# Patient Record
Sex: FEMALE | Race: WHITE | NOT HISPANIC OR LATINO | Employment: FULL TIME | URBAN - METROPOLITAN AREA
[De-identification: names, ages, dates, MRNs, and addresses within clinical notes are randomized per-mention and may not be internally consistent; named-entity substitution may affect disease eponyms.]

---

## 2021-05-25 ENCOUNTER — APPOINTMENT (OUTPATIENT)
Dept: VACCINE CLINIC | Facility: HOSPITAL | Age: 31
End: 2021-05-25

## 2021-05-29 ENCOUNTER — IMMUNIZATION (OUTPATIENT)
Dept: VACCINE CLINIC | Facility: HOSPITAL | Age: 31
End: 2021-05-29

## 2021-05-29 PROCEDURE — 0001A: CPT | Performed by: INTERNAL MEDICINE

## 2021-05-29 PROCEDURE — 91300 HC SARSCOV02 VAC 30MCG/0.3ML IM: CPT | Performed by: INTERNAL MEDICINE

## 2021-06-19 ENCOUNTER — IMMUNIZATION (OUTPATIENT)
Dept: VACCINE CLINIC | Facility: HOSPITAL | Age: 31
End: 2021-06-19

## 2021-06-19 PROCEDURE — 0002A: CPT | Performed by: INTERNAL MEDICINE

## 2021-06-19 PROCEDURE — 91300 HC SARSCOV02 VAC 30MCG/0.3ML IM: CPT | Performed by: INTERNAL MEDICINE

## 2023-02-24 ENCOUNTER — OFFICE VISIT (OUTPATIENT)
Dept: FAMILY MEDICINE CLINIC | Facility: CLINIC | Age: 33
End: 2023-02-24
Payer: COMMERCIAL

## 2023-02-24 VITALS
WEIGHT: 231.5 LBS | OXYGEN SATURATION: 96 % | SYSTOLIC BLOOD PRESSURE: 134 MMHG | RESPIRATION RATE: 18 BRPM | TEMPERATURE: 98.7 F | HEIGHT: 66 IN | BODY MASS INDEX: 37.21 KG/M2 | HEART RATE: 104 BPM | DIASTOLIC BLOOD PRESSURE: 83 MMHG

## 2023-02-24 DIAGNOSIS — Z13.220 SCREENING FOR LIPOID DISORDERS: ICD-10-CM

## 2023-02-24 DIAGNOSIS — E06.9 THYROIDITIS: ICD-10-CM

## 2023-02-24 DIAGNOSIS — R41.840 INATTENTION: ICD-10-CM

## 2023-02-24 DIAGNOSIS — F41.1 GENERALIZED ANXIETY DISORDER: ICD-10-CM

## 2023-02-24 DIAGNOSIS — I10 PRIMARY HYPERTENSION: Primary | ICD-10-CM

## 2023-02-24 PROCEDURE — 99214 OFFICE O/P EST MOD 30 MIN: CPT | Performed by: FAMILY MEDICINE

## 2023-02-24 RX ORDER — DOXEPIN HYDROCHLORIDE 10 MG/1
10 CAPSULE ORAL NIGHTLY
COMMUNITY

## 2023-02-24 RX ORDER — METOPROLOL SUCCINATE 25 MG/1
25 TABLET, EXTENDED RELEASE ORAL EVERY EVENING
Qty: 90 TABLET | Refills: 1 | Status: SHIPPED | OUTPATIENT
Start: 2023-02-24

## 2023-02-24 RX ORDER — LANOLIN ALCOHOL/MO/W.PET/CERES
CREAM (GRAM) TOPICAL
COMMUNITY

## 2023-02-24 RX ORDER — LEVOTHYROXINE SODIUM 0.05 MG/1
50 TABLET ORAL
Qty: 90 TABLET | Refills: 1 | Status: SHIPPED | OUTPATIENT
Start: 2023-02-24

## 2023-02-24 RX ORDER — BUPROPION HYDROCHLORIDE 150 MG/1
150 TABLET ORAL DAILY
COMMUNITY
End: 2023-02-24 | Stop reason: SDUPTHER

## 2023-02-24 RX ORDER — BUPROPION HYDROCHLORIDE 300 MG/1
300 TABLET ORAL DAILY
Qty: 90 TABLET | Refills: 1 | Status: SHIPPED | OUTPATIENT
Start: 2023-02-24

## 2023-02-24 RX ORDER — METOPROLOL SUCCINATE 25 MG/1
25 TABLET, EXTENDED RELEASE ORAL EVERY EVENING
COMMUNITY
Start: 2022-12-22 | End: 2023-02-24 | Stop reason: SDUPTHER

## 2023-02-24 RX ORDER — LEVOTHYROXINE SODIUM 0.05 MG/1
100 TABLET ORAL
COMMUNITY
Start: 2022-12-25 | End: 2023-02-24 | Stop reason: SDUPTHER

## 2023-02-24 NOTE — PATIENT INSTRUCTIONS
You are going to try to find your psychological assessment from when you were a kid.  If you can then I will give you a prescription for Vyvanse to help you get through your test.  We also decided to bump up Wellbutrin to see if that helps you as well.  We did discuss You may be given a controlled substance.  These drugs are addicting and must be used with caution.  Use the drug only when needed and avoid operating heavy machinery or driving when using the medication until you know how you respond.  Dispose of the medication properly when you no longer need it.  I need to see you every 90 days, routine drug testing and Say monitoring will be done as required by [place] law.  If 90 days passes, I can not renew the medication. I recommend you schedule now.    I have ordered lab tests today.  You should receive a phone call or a [employer] message with those results.  If you have not heard from us in 7-10 days, please call the office.

## 2023-02-24 NOTE — PROGRESS NOTES
"Subjective     Regina Osman is a 33 y.o. female who presents with   Chief Complaint   Patient presents with   • Hypothyroidism   • Hypertension       History of Present Illness     Patient presents for follow-up of hypertension and thyroiditis.  She is not having any problems with her medications.  She also presents with anxiety that is doing well with Wellbutrin  mg.    Prior history of ADHD and was treated with Vyvanse in high school and studying for a [employer] exam and struggling with the studying.  Pass rate last test was 11-40% so she is worried.     She expresses interest in an increase in her Wellbutrin as an option as well for her attention needs.      The following data was reviewed by: Kathi Medrano MD on 02/24/2023:    Labs and recent visits reviewed in [facility] EHR         Review of Systems     Objective     /83 (BP Location: Right arm, Patient Position: Sitting, Cuff Size: Large Adult)   Pulse 104   Temp 98.7 °F (37.1 °C) (Oral)   Resp 18   Ht 167.6 cm (66\")   Wt 105 kg (231 lb 8 oz)   LMP 01/26/2023 (Approximate)   SpO2 96%   Breastfeeding No   BMI 37.37 kg/m²     Physical Exam  Constitutional:       Appearance: Normal appearance.   Cardiovascular:      Rate and Rhythm: Normal rate and regular rhythm.      Heart sounds: Normal heart sounds.   Pulmonary:      Effort: Pulmonary effort is normal.      Breath sounds: Normal breath sounds.   Neurological:      Mental Status: She is alert.   Psychiatric:         Behavior: Behavior normal.         Procedures EKG reviewed from 6/8/2021 [facility]    Assessment & Plan   Diagnoses and all orders for this visit:    1. Primary hypertension (Primary)  -     Comprehensive Metabolic Panel  -     metoprolol succinate XL (TOPROL-XL) 25 MG 24 hr tablet; Take 1 tablet by mouth Every Evening.  Dispense: 90 tablet; Refill: 1    2. Thyroiditis  -     TSH  -     Thyroid Peroxidase Antibody; Future  -     levothyroxine (SYNTHROID, " LEVOTHROID) 50 MCG tablet; Take 1 tablet by mouth Every Morning.  Dispense: 90 tablet; Refill: 1    3. Generalized anxiety disorder  -     buPROPion XL (WELLBUTRIN XL) 300 MG 24 hr tablet; Take 1 tablet by mouth Daily.  Dispense: 90 tablet; Refill: 1    4. Screening for lipoid disorders  -     Comprehensive Metabolic Panel  -     Lipid Panel    5. Inattention  -     buPROPion XL (WELLBUTRIN XL) 300 MG 24 hr tablet; Take 1 tablet by mouth Daily.  Dispense: 90 tablet; Refill: 1         Discussion    Patient Instructions   You are going to try to find your psychological assessment from when you were a kid.  If you can then I will give you a prescription for Vyvanse to help you get through your test.  We also decided to bump up Wellbutrin to see if that helps you as well.  I have ordered lab tests today.  You should receive a phone call or a [employer] message with those results.  If you have not heard from us in 7-10 days, please call the office.                  Kathi Medrano MD

## 2023-02-25 LAB
ALBUMIN SERPL-MCNC: 4.6 G/DL (ref 3.8–4.8)
ALBUMIN/GLOB SERPL: 1.7 {RATIO} (ref 1.2–2.2)
ALP SERPL-CCNC: 84 IU/L (ref 44–121)
ALT SERPL-CCNC: 38 IU/L (ref 0–32)
AST SERPL-CCNC: 38 IU/L (ref 0–40)
BILIRUB SERPL-MCNC: 0.4 MG/DL (ref 0–1.2)
BUN SERPL-MCNC: 8 MG/DL (ref 6–20)
BUN/CREAT SERPL: 11 (ref 9–23)
CALCIUM SERPL-MCNC: 9.7 MG/DL (ref 8.7–10.2)
CHLORIDE SERPL-SCNC: 102 MMOL/L (ref 96–106)
CHOLEST SERPL-MCNC: 177 MG/DL (ref 100–199)
CO2 SERPL-SCNC: 22 MMOL/L (ref 20–29)
CREAT SERPL-MCNC: 0.76 MG/DL (ref 0.57–1)
EGFRCR SERPLBLD CKD-EPI 2021: 107 ML/MIN/1.73
GLOBULIN SER CALC-MCNC: 2.7 G/DL (ref 1.5–4.5)
GLUCOSE SERPL-MCNC: 94 MG/DL (ref 70–99)
HDLC SERPL-MCNC: 30 MG/DL
LDLC SERPL CALC-MCNC: 113 MG/DL (ref 0–99)
POTASSIUM SERPL-SCNC: 4.5 MMOL/L (ref 3.5–5.2)
PROT SERPL-MCNC: 7.3 G/DL (ref 6–8.5)
SODIUM SERPL-SCNC: 138 MMOL/L (ref 134–144)
TRIGL SERPL-MCNC: 191 MG/DL (ref 0–149)
TSH SERPL DL<=0.005 MIU/L-ACNC: 1.57 UIU/ML (ref 0.45–4.5)
VLDLC SERPL CALC-MCNC: 34 MG/DL (ref 5–40)

## 2023-05-20 ENCOUNTER — E-VISIT (OUTPATIENT)
Dept: FAMILY MEDICINE CLINIC | Facility: TELEHEALTH | Age: 33
End: 2023-05-20
Payer: COMMERCIAL

## 2023-05-20 PROCEDURE — BRIGHTMDVISIT: Performed by: NURSE PRACTITIONER

## 2023-05-21 RX ORDER — PHENAZOPYRIDINE HYDROCHLORIDE 200 MG/1
200 TABLET, FILM COATED ORAL 3 TIMES DAILY PRN
Qty: 6 TABLET | Refills: 0 | Status: SHIPPED | OUTPATIENT
Start: 2023-05-21 | End: 2023-05-23

## 2023-05-21 NOTE — EXTERNAL PATIENT INSTRUCTIONS
Note   increase water PCP if symptoms continue ER for worsening symptoms such as high fever, nausea vomiting or abdominal or back pain   Diagnosis   Urinary tract infection (UTI)   My name is CHRISTIANO Almonte. I'm a healthcare provider at [facility] [place] [facility]. After reviewing your interview, I see you have a urinary tract infection (UTI).   Medications   Your pharmacy   [id number] [place] [id number] [phone]     Prescription   Nitrofurantoin monohydrate/macrocrystalline (100mg): Take 1 capsule by mouth every 12 hours for 5 days for infection. This medication is an antibiotic. Take it exactly as directed. You must finish the entire course of medication, even if you feel better after taking the first few doses.   About your diagnosis   A UTI is an infection of one or more parts of the urinary tract, most commonly the bladder.   Most UTIs are caused by bacteria (usually E. coli) that travel up the urethra and into the bladder. I see that you have some common signs and symptoms of a UTI:    Frequent urination    Sudden urge to urinate    Symptoms that feel a lot like past UTIs    Mild or moderate pain, pressure, or discomfort in your lower abdomen    Symptoms that began shortly after sexual intercourse   Fortunately, most UTIs aren't serious, and they're easily treated with antibiotics. Make sure you take all of the antibiotic pills given to you, even if you start to feel better after the first few doses. Otherwise, the UTI might come back.   What to expect   If you follow this treatment plan, you should start to feel better within 1 to 2 days.   When to seek care   Call us at [phone]   with any sudden or unexpected symptoms.    Symptoms that don't improve or get worse in the next 48 hours    Fever that goes above 101F or lasts longer than 24 hours    Shaking or chills    Nausea or vomiting    Severe flank pain (pain in your back or side)   Other treatment    Rest and drink  plenty of water    Urinate frequently and when you first feel the urge    Place a heating pad on your back or stomach to help relieve some of the discomfort   Prevention    Drink a lot of liquids to help flush bacteria from your system. Water is best. Try for six to eight, 8-ounce glasses a day on a regular basis.    Urinate often and when you first feel the urge. Bacteria can grow when urine stays in the bladder too long. Urinate after sex to flush away bacteria.    After using the toilet, always wipe from front to back. This step is most important after a bowel movement. Wiping from front to back prevents bacteria normally found in stool from entering the urinary tract.   Your provider   Your diagnosis was provided by CHRISTIANO Almonte, a member of your trusted care team at [facility].   If you have any questions, call us at [phone]  .

## 2023-05-21 NOTE — E-VISIT TREATED
Chief Complaint: Bladder infection (UTI)   Patient introduction   Patient is 33-year-old female. Patient provided the following organ inventory: Presence of a vagina, ovaries, a uterus, and breasts.   Patient has had frequent urination and urinary urgency for less than 24 hours.   Urine is yellow with no unusual odor.   General presentation   Patient has not had a fever. No nausea or vomiting.   Moderate abdominal or pelvic pain.   No back pain.   No flank pain.   The following treatments were helpful for current symptoms:    Phenazopyridine   The following treatments were not helpful for current symptoms:    Acetaminophen    Ibuprofen   Previous history of UTI. Current symptoms feel exactly the same as previous UTIs. Received treatment for UTI 1 to 3 times in last year. Patient's last use of antibiotics for UTI was more than 3 months ago.   No known history of yeast infections as a result of taking antibiotics for past UTIs.   No history of pyelonephritis. History of kidney stones, but not within the last year.   Had sexual intercourse in the past week. Does not use diaphragm. No unprotected sexual intercourse with a new partner in the last 2 weeks.   Patient is not being treated for diabetes mellitus.   Review of red flags/alarm symptoms:    No recent hospitalizations or nursing home care (last 3 months)    No history of renal failure    No recent history of urologic instrumentation    No anatomic abnormalities of the urinary tract    No abnormal vaginal discharge    No visible vaginal sores    No pain with sexual intercourse    No abnormal vaginal bleeding or spotting   Pregnancy/menstrual status/breastfeeding:   Patient is not pregnant. Patient is not breastfeeding. Regarding date of last menstrual period, patient writes: Started period 4/29 end on 5/3.   Current medications   Currently taking levothyroxine 50 MCG tablet, metoprolol succinate XL 25 MG 24 hr tablet, and buPROPion  MG 24 hr tablet.    Medication allergies    Sulfa drugs   Medication contraindication review   Not taking ACE inhibitors and ARBs.   Patient is being treated for high blood pressure. Therefore, the following medication(s) will not be prescribed:    Ciprofloxacin   No known history of amoxicillin-clavulanate-associated cholestatic jaundice or nitrofurantoin-associated cholestatic jaundice.   Past medical history   Immune conditions: No immunocompromising conditions.   No history of cancer.   Patient did not request an excuse note.   Assessment   Uncomplicated acute UTI.   This is the likely diagnosis based on patient's interview responses, including:    Symptom profile    Previous history of UTI    Current symptoms are exactly the same as previous UTIs    Recent history of delaying urination   No recent history of kidney stones.   Plan   Medications:    nitrofurantoin monohydrate/macrocrystals 100 mg capsule RX 100mg 1 cap PO q12h 5d for infection. This medication is an antibiotic. Take it exactly as directed. You must finish the entire course of medication, even if you feel better after taking the first few doses. Amount is 10 cap.   The patient's prescription will be sent to:   [facility]/pharmacy #[id number]   [address] [place] [address]   Phone: [phone]     Fax: [phone]   Education:    Condition and causes    Prevention    Treatment and self-care    When to call provider   Follow-up:   Patient to follow up as needed for progression or lack of improvement in symptoms within 3d.   ----------   Electronically signed by CHRISTIANO Almonte on 2023-05-20 at 23:43PM   ----------   Patient Interview Transcript:   Knowing about your anatomy is important for diagnosing and treating UTIs. The gender we have on file for you is female, but we realize that this might not tell the whole story. Would you like to tell us more about your anatomy?    Yes   Not selected:    No   OK, which of these do you have? Select all that apply.     Vagina    Ovaries    Uterus    Breasts   Not selected:    Penis    Testes    Prostate   Which of these symptoms do you have? Select all that apply.    Frequent urination    Sudden urge to urinate and it's hard to hold the urine in   Not selected:    Pain or burning while urinating   How long have you had these symptoms? Select one.    Less than 24 hours   Not selected:    1 to 3 days    4 to 6 days    7 to 10 days    More than 10 days   Since your current symptoms started, has it been difficult to start, stop, or delay urination? Select one.    No   Not selected:    Yes   What color is your urine? Select one.    Yellow   Not selected:    Clear    Cloudy    Pink or red   Does your urine smell strange (like ammonia) or stronger than usual? Select one.    No   Not selected:    Yes   Do you also have any of these symptoms? Select all that apply.    Pain, pressure, or discomfort in the lower abdomen   Not selected:    Fever    Nausea    Vomiting    Back pain    No   How would you describe your lower abdominal pain, pressure, or discomfort? Select one.    Moderate; it's uncomfortable and gets in the way of doing daily tasks   Not selected:    Mild; I only notice it when I pay attention to it    Severe; I can't get comfortable, and it stops me from doing daily tasks   Do you have any flank pain? The flank is the side of the body between the ribs and the hips.    No   Not selected:    Yes, in my left flank    Yes, in my right flank    Yes, in both my left and right flanks   Do you have any of these vaginal symptoms? Select all that apply.    No   Not selected:    Abnormal vaginal itching    Unscheduled or abnormal vaginal bleeding or spotting    Pain during sex    Visible sores on the vagina    Abnormal vaginal discharge   In the past 2 weeks, have you had a medical device or instrument placed in your urinary tract? Examples include catheters, stents, and nephrostomy tubes. Select one.    No   Not selected:    Yes   Have  you recently been hospitalized or been a resident of a nursing home or other long-term care facility? This doesn't include emergency room (ER) visits. Select one.    No   Not selected:    Yes, within the last 2 weeks    Yes, within the last 3 months   Have you ever had severe problems with your kidneys, such as kidney failure? Select one.    No, not that I recall   Not selected:    Yes   Kidney stones    More than a year ago   Not selected:    Within the last year    No   Kidney infection (pyelonephritis)    No   Not selected:    Within the last year    More than a year ago   Have you ever been diagnosed with any of these? Select all that apply.    No   Not selected:    Urinary reflux    Bladder diverticula    Single (or horseshoe) kidney    Duplicated urethra   Have you recently held your urine for a long time after you felt the urge to go? Select one.    Yes   Not selected:    No   Have you recently avoided eating or drinking so you wouldn't have the urge to urinate as often? Select one.    No   Not selected:    Yes   Do you use a diaphragm? Select one.    No   Not selected:    Yes   Are you pregnant? Select one.    No   Not selected:    Yes   When was your last menstrual period? If you don't currently have periods or no longer have periods, please briefly explain.    Started period 4/29 end on 5/3   Are you breastfeeding? Select one.    No   Not selected:    Yes   Have you had sexual intercourse in the past week? Recent sexual intercourse is a risk factor for urinary tract infections. Select one.    Yes   Not selected:    No   Have you had unprotected sexual intercourse with a new partner in the last 2 weeks? Select one.    No   Not selected:    Yes   Have you traveled to any of these countries within the last 3 months? Recent travel to these countries may affect which medication we recommend for your symptoms. Select all that apply.    None of these   Not selected:    [place]    [place]    [place]    [place]    Acetaminophen (Tylenol)    Not helpful   Not selected:    Helpful   Ibuprofen (Advil, Motrin)    Not helpful   Not selected:    Helpful   Phenazopyridine (Azo, Baridium, Pyridium, Uricalm, Uristat)    Helpful   Not selected:    Not helpful   Have you ever had a urinary tract infection (UTI)? A UTI is often called a bladder infection or acute cystitis. Select one.    Yes   Not selected:    No, not that I know of   How much do your current symptoms feel like past UTIs? Select one.    Exactly the same   Not selected:    Mostly the same    Somewhat the same    Totally different   In the past year, how many times have you taken antibiotics for a UTI? Select one.    1 to 3   Not selected:    0    4 or more   When did you last take antibiotics for a UTI? Select one.    More than 3 months ago   Not selected:    Within the last 3 months   Have you ever developed a yeast infection as a result of taking antibiotics? Select one.    No, not that I know of   Not selected:    Yes   UTIs may be more serious when other factors are present. Let's address those now. Are you being treated for type 1 or type 2 diabetes? Select one.    No   Not selected:    Yes   Do you have any of these conditions that can affect the immune system? Scroll to see all options. Select all that apply.    None of these   Not selected:    History of bone marrow transplant    Chronic kidney disease    Chronic liver disease (including cirrhosis)    HIV/AIDS    Inflammatory bowel disease (Crohn's disease or ulcerative colitis)    Lupus    Moderate to severe plaque psoriasis    Multiple sclerosis    Rheumatoid arthritis    Sickle cell anemia    Alpha or beta thalassemia    History of solid organ transplant (kidney, liver, or heart)    History of spleen removal    An autoimmune disorder not listed here (specify)    A condition requiring treatment with long-term use of oral steroids (such as prednisone, prednisolone, or dexamethasone) (specify)   Have you ever been  "diagnosed with cancer? Select one.    No   Not selected:    Yes, I have cancer now    Yes, but I'm in remission   These last few questions will help us create the right treatment plan for you. Are you being treated for any of these conditions? Select all that apply.    High blood pressure   Not selected:    Jasmyn-Danlos syndrome    Folate deficiency    G6PD deficiency    History of aortic aneurysm or dissection    Marfan syndrome    Megaloblastic anemia    Mono (mononucleosis)    Myasthenia gravis    Oliguria or anuria    Peripheral vascular disease    No   Have you ever had jaundice as a result of taking amoxicillin-clavulanate (Augmentin) or nitrofurantoin (Macrobid)? Select all that apply.    No   Not selected:    Yes, from amoxicillin-clavulanate (Augmentin)    Yes, from nitrofurantoin (Macrobid, Macrodantin)   Are you taking any of these medications? Select all that apply.    No   Not selected:    An ACE inhibitor such as lisinopril, enalapril, captopril, or benazepril    An angiotensin II receptor blocker (ARB) such as candesartan, irbesartan, losartan, or valsartan   Are you still taking these medications listed in your medical record? If you're not taking any of these, click Next. Select all that apply.    levothyroxine 50 MCG tablet    metoprolol succinate XL 25 MG 24 hr tablet    buPROPion  MG 24 hr tablet   Are you taking any other medications, vitamins, or supplements? Select one.    No   Not selected:    Yes   Have you ever had an allergic or bad reaction to any medication? Select one.    Yes   Not selected:    No   Have you had an allergic or bad reaction to any of these medications? Select all that apply.    Sulfamethoxazole-trimethoprim (brands include Bactrim and Septra) or any other sulfa drug   Not selected:    Any antibiotic starting with \"cef-,\" such as cefazolin, cefdinir, cefuroxime, ceftriaxone, ceftazidime, cefepime, or cephalexin (brands include Fortaz and Keflex)    Any antibiotic " "ending with \"-floxacin,\" such as ciprofloxacin, gemifloxacin, levofloxacin, moxifloxacin, or ofloxacin (brands include Cipro, Factive, Floxin, and Levaquin)    Any antibiotic ending with \"-cillin,\" such as penicillin, amoxicillin, ampicillin, dicloxacillin, nafcillin, or piperacillin (brands include Augmentin, Unasyn, and Zosyn)    Nitrofurantoin (brands include Furadantin, Macrobid and Macrodantin)    Fluconazole (brand name Diflucan), itraconazole, or terconazole    Trimethoprim (brand name Primsol)    No, not that I know of   Have you had an allergic or bad reaction to any of these medications? Select all that apply.    No   Not selected:    Acetaminophen (Tylenol)    Ibuprofen (Advil, Midol, Motrin)    Phenazopyridine (Azo, Baridium, Pyridium, Uricalm, Uristat)   Do you need a doctor's note? A doctor's note confirms that you received care today and states when you can return to school or work. It does not contain information about your diagnosis or treatment plan. Your provider will make the final decision on whether to give you a doctor's note. Doctor's notes CANNOT be backdated. Select one.    No   Not selected:    Today only (1 day)    Today and tomorrow (2 days)    3 days   Is there anything you'd like to add about your symptoms? Please limit your comments to the symptoms asked about in this interview. If you include comments about other concerns, your provider may recommend that you be seen in person.   The patient did not enter any additional information.   ----------   Medical history   Medical history data does not currently exist for this patient.    "

## 2023-08-15 DIAGNOSIS — R41.840 INATTENTION: ICD-10-CM

## 2023-08-15 DIAGNOSIS — F41.1 GENERALIZED ANXIETY DISORDER: ICD-10-CM

## 2023-08-15 RX ORDER — BUPROPION HYDROCHLORIDE 300 MG/1
TABLET ORAL
Qty: 90 TABLET | Refills: 1 | Status: SHIPPED | OUTPATIENT
Start: 2023-08-15

## 2023-08-15 RX ORDER — DOXEPIN HYDROCHLORIDE 10 MG/1
CAPSULE ORAL
Qty: 180 CAPSULE | Refills: 1 | Status: SHIPPED | OUTPATIENT
Start: 2023-08-15

## 2023-08-21 DIAGNOSIS — I10 PRIMARY HYPERTENSION: ICD-10-CM

## 2023-08-21 DIAGNOSIS — E06.9 THYROIDITIS: ICD-10-CM

## 2023-08-21 RX ORDER — LEVOTHYROXINE SODIUM 0.05 MG/1
TABLET ORAL
Qty: 90 TABLET | Refills: 1 | Status: SHIPPED | OUTPATIENT
Start: 2023-08-21

## 2023-08-21 RX ORDER — METOPROLOL SUCCINATE 25 MG/1
TABLET, EXTENDED RELEASE ORAL
Qty: 90 TABLET | Refills: 1 | Status: SHIPPED | OUTPATIENT
Start: 2023-08-21

## 2024-03-24 DIAGNOSIS — R41.840 INATTENTION: ICD-10-CM

## 2024-03-24 DIAGNOSIS — F41.1 GENERALIZED ANXIETY DISORDER: ICD-10-CM

## 2024-03-24 DIAGNOSIS — I10 PRIMARY HYPERTENSION: ICD-10-CM

## 2024-03-24 RX ORDER — BUPROPION HYDROCHLORIDE 300 MG/1
TABLET ORAL
Qty: 90 TABLET | Refills: 0 | Status: SHIPPED | OUTPATIENT
Start: 2024-03-24

## 2024-03-24 RX ORDER — METOPROLOL SUCCINATE 25 MG/1
TABLET, EXTENDED RELEASE ORAL
Qty: 90 TABLET | Refills: 0 | Status: SHIPPED | OUTPATIENT
Start: 2024-03-24

## 2024-05-29 DIAGNOSIS — E06.9 THYROIDITIS: ICD-10-CM

## 2024-05-29 RX ORDER — LEVOTHYROXINE SODIUM 0.05 MG/1
TABLET ORAL
Qty: 90 TABLET | Refills: 1 | Status: SHIPPED | OUTPATIENT
Start: 2024-05-29

## 2024-06-11 ENCOUNTER — OFFICE VISIT (OUTPATIENT)
Dept: FAMILY MEDICINE CLINIC | Facility: CLINIC | Age: 34
End: 2024-06-11
Payer: COMMERCIAL

## 2024-06-11 VITALS
BODY MASS INDEX: 34.92 KG/M2 | WEIGHT: 217.3 LBS | HEART RATE: 97 BPM | SYSTOLIC BLOOD PRESSURE: 136 MMHG | HEIGHT: 66 IN | DIASTOLIC BLOOD PRESSURE: 84 MMHG | OXYGEN SATURATION: 98 % | RESPIRATION RATE: 16 BRPM

## 2024-06-11 DIAGNOSIS — E66.9 OBESITY (BMI 35.0-39.9 WITHOUT COMORBIDITY): ICD-10-CM

## 2024-06-11 DIAGNOSIS — Z13.220 NEED FOR LIPID SCREENING: ICD-10-CM

## 2024-06-11 DIAGNOSIS — Z86.32 PERSONAL HISTORY OF GESTATIONAL DIABETES: ICD-10-CM

## 2024-06-11 DIAGNOSIS — I10 PRIMARY HYPERTENSION: Primary | ICD-10-CM

## 2024-06-11 DIAGNOSIS — E06.9 THYROIDITIS: ICD-10-CM

## 2024-06-11 DIAGNOSIS — F41.1 GENERALIZED ANXIETY DISORDER: ICD-10-CM

## 2024-06-11 PROCEDURE — 99214 OFFICE O/P EST MOD 30 MIN: CPT | Performed by: FAMILY MEDICINE

## 2024-06-11 RX ORDER — SEMAGLUTIDE 0.25 MG/.5ML
0.25 INJECTION, SOLUTION SUBCUTANEOUS WEEKLY
Qty: 2 ML | Refills: 0 | Status: SHIPPED | OUTPATIENT
Start: 2024-06-11

## 2024-06-11 RX ORDER — BUPROPION HYDROCHLORIDE 300 MG/1
300 TABLET ORAL DAILY
Qty: 90 TABLET | Refills: 0 | Status: SHIPPED | OUTPATIENT
Start: 2024-06-11

## 2024-06-11 RX ORDER — METOPROLOL SUCCINATE 25 MG/1
25 TABLET, EXTENDED RELEASE ORAL EVERY EVENING
Qty: 90 TABLET | Refills: 0 | Status: SHIPPED | OUTPATIENT
Start: 2024-06-11

## 2024-06-11 RX ORDER — LEVOTHYROXINE SODIUM 0.05 MG/1
50 TABLET ORAL EVERY MORNING
Qty: 90 TABLET | Refills: 1 | Status: SHIPPED | OUTPATIENT
Start: 2024-06-11

## 2024-06-11 NOTE — PATIENT INSTRUCTIONS
I have ordered lab tests today.  You should receive a phone call or a [employer] message with those results.  If you have not heard from us in 7-10 days, please call the office.      I will prescribed Wegovy or Zepbound but there are limitations.  They are expensive and coverage varies by insurance plan. If it's not covered, it costs about $1500 a month.   All drugs have risks and benefits.  They carry a black box warning due to an increase in thyroid C-cell tumors in rodent experiments.  The risk is dose and duration related.  We do not know how this translates to humans but people with certain thyroid cancer histories or risks, can not use this drug.   Just because insurance says that a drug is covered with prior approval, does not mean that's true.  Many plans exclude all weight loss medications and interventions and we do not know your plan's coverage.  Prior authorizations take time and staff members efforts and often get denied.  It is unethical to prescribe the diabetes form of the drug if you are not a diabetic.  Having elevated blood sugars without an actual diabetes diagnosis does not count.   I do not prescribe semiglutide to be compounded, there are weight loss centers that do that and you can choose to use one of those if desired.   There have been shortages of the medications and there is no substitution available when that happens.  If it is a dose specific shortage, we could go ONE dose up or drop the dose down to keep you on the drug during the shortage. We can not prescribed the diabetes form of the drug. If you have to go off, you need to start back at the beginning dose.   You need to have a long term plan on how you're going to manage to maintain your weight loss as well.     Let me know when you're ready for your next dose if Wegovy is covered.  We increase monthly until we max out or your have side effects.

## 2024-06-11 NOTE — ASSESSMENT & PLAN NOTE
Patient's (Body mass index is 35.07 kg/m².) indicates that they are obese (BMI >30) with health conditions that include hypertension . Weight is improving with lifestyle modifications. BMI  is above average; BMI management plan is completed. We discussed  keep up what you are doing  and we decided to try Wegovy.

## 2024-06-11 NOTE — PROGRESS NOTES
"Subjective     Regina Osman is a 34 y.o. female who presents with   Chief Complaint   Patient presents with    Hypertension       Hypertension    Hypertension and has lost some weight as well.  Tried getting rid of metoprolol but she was getting headaches.    Anxiety issues are stable on Wellbutrin XL.  She knows she should do some therapy but struggles to get back to that.  Doesn't want to reduce the Wellbutrin because she feels good overall.  Working full time and in a long-term relationship.     Thyroiditis and on levothyroxine 50mcg.     Obesity and asking about Wegovy.              Review of Systems     Objective     /84   Pulse 97   Resp 16   Ht 167.6 cm (66\")   Wt 98.6 kg (217 lb 4.8 oz)   SpO2 98%   Breastfeeding No   BMI 35.07 kg/m²     Physical Exam  Constitutional:       Appearance: Normal appearance.   Cardiovascular:      Rate and Rhythm: Normal rate and regular rhythm.      Heart sounds: Normal heart sounds.   Pulmonary:      Effort: Pulmonary effort is normal.      Breath sounds: Normal breath sounds.   Neurological:      Mental Status: She is alert.   Psychiatric:         Behavior: Behavior normal.                 Procedures     Assessment & Plan   Diagnoses and all orders for this visit:    1. Primary hypertension (Primary)  -     Comprehensive Metabolic Panel  -     CBC & Differential  -     metoprolol succinate XL (TOPROL-XL) 25 MG 24 hr tablet; Take 1 tablet by mouth Every Evening.  Dispense: 90 tablet; Refill: 0    2. Thyroiditis  -     TSH  -     Thyroid Peroxidase Antibody  -     levothyroxine (SYNTHROID, LEVOTHROID) 50 MCG tablet; Take 1 tablet by mouth Every Morning.  Dispense: 90 tablet; Refill: 1    3. Generalized anxiety disorder  -     buPROPion XL (WELLBUTRIN XL) 300 MG 24 hr tablet; Take 1 tablet by mouth Daily.  Dispense: 90 tablet; Refill: 0    4. Obesity (BMI 35.0-39.9 without comorbidity)  Assessment & Plan:  Patient's (Body mass index is 35.07 kg/m².) indicates " that they are obese (BMI >30) with health conditions that include hypertension . Weight is improving with lifestyle modifications. BMI  is above average; BMI management plan is completed. We discussed  keep up what you are doing  and we decided to try Wegovy.     Orders:  -     Semaglutide-Weight Management (Wegovy) 0.25 MG/0.5ML solution auto-injector; Inject 0.5 mL under the skin into the appropriate area as directed 1 (One) Time Per Week.  Dispense: 2 mL; Refill: 0    5. Need for lipid screening  -     Lipid Panel    6. Personal history of gestational diabetes  -     Hemoglobin A1c          Discussion    Patient Instructions   I have ordered lab tests today.  You should receive a phone call or a [employer] message with those results.  If you have not heard from us in 7-10 days, please call the office.      I will prescribed Wegovy or Zepbound but there are limitations.  They are expensive and coverage varies by insurance plan. If it's not covered, it costs about $1500 a month.   All drugs have risks and benefits.  They carry a black box warning due to an increase in thyroid C-cell tumors in rodent experiments.  The risk is dose and duration related.  We do not know how this translates to humans but people with certain thyroid cancer histories or risks, can not use this drug.   Just because insurance says that a drug is covered with prior approval, does not mean that's true.  Many plans exclude all weight loss medications and interventions and we do not know your plan's coverage.  Prior authorizations take time and staff members efforts and often get denied.  It is unethical to prescribe the diabetes form of the drug if you are not a diabetic.  Having elevated blood sugars without an actual diabetes diagnosis does not count.   I do not prescribe semiglutide to be compounded, there are weight loss centers that do that and you can choose to use one of those if desired.   There have been shortages of the medications and  there is no substitution available when that happens.  If it is a dose specific shortage, we could go ONE dose up or drop the dose down to keep you on the drug during the shortage. We can not prescribed the diabetes form of the drug. If you have to go off, you need to start back at the beginning dose.   You need to have a long term plan on how you're going to manage to maintain your weight loss as well.     Let me know when you're ready for your next dose if Wegovy is covered.  We increase monthly until we max out or your have side effects.               Kathi Medrano MD

## 2024-06-12 ENCOUNTER — PATIENT MESSAGE (OUTPATIENT)
Dept: FAMILY MEDICINE CLINIC | Facility: CLINIC | Age: 34
End: 2024-06-12
Payer: COMMERCIAL

## 2024-06-12 LAB
ALBUMIN SERPL-MCNC: 4.5 G/DL (ref 3.5–5.2)
ALBUMIN/GLOB SERPL: 2 G/DL
ALP SERPL-CCNC: 81 U/L (ref 39–117)
ALT SERPL-CCNC: 11 U/L (ref 1–33)
AST SERPL-CCNC: 18 U/L (ref 1–32)
BASOPHILS # BLD AUTO: 0.07 10*3/MM3 (ref 0–0.2)
BASOPHILS NFR BLD AUTO: 0.8 % (ref 0–1.5)
BILIRUB SERPL-MCNC: 0.4 MG/DL (ref 0–1.2)
BUN SERPL-MCNC: 6 MG/DL (ref 6–20)
BUN/CREAT SERPL: 9.1 (ref 7–25)
CALCIUM SERPL-MCNC: 9.3 MG/DL (ref 8.6–10.5)
CHLORIDE SERPL-SCNC: 103 MMOL/L (ref 98–107)
CHOLEST SERPL-MCNC: 155 MG/DL (ref 0–200)
CO2 SERPL-SCNC: 24.3 MMOL/L (ref 22–29)
CREAT SERPL-MCNC: 0.66 MG/DL (ref 0.57–1)
EGFRCR SERPLBLD CKD-EPI 2021: 119 ML/MIN/1.73
EOSINOPHIL # BLD AUTO: 0.23 10*3/MM3 (ref 0–0.4)
EOSINOPHIL NFR BLD AUTO: 2.5 % (ref 0.3–6.2)
ERYTHROCYTE [DISTWIDTH] IN BLOOD BY AUTOMATED COUNT: 13.7 % (ref 12.3–15.4)
GLOBULIN SER CALC-MCNC: 2.3 GM/DL
GLUCOSE SERPL-MCNC: 92 MG/DL (ref 65–99)
HBA1C MFR BLD: 5.3 % (ref 4.8–5.6)
HCT VFR BLD AUTO: 38 % (ref 34–46.6)
HDLC SERPL-MCNC: 33 MG/DL (ref 40–60)
HGB BLD-MCNC: 12.2 G/DL (ref 12–15.9)
IMM GRANULOCYTES # BLD AUTO: 0.02 10*3/MM3 (ref 0–0.05)
IMM GRANULOCYTES NFR BLD AUTO: 0.2 % (ref 0–0.5)
LDLC SERPL CALC-MCNC: 80 MG/DL (ref 0–100)
LYMPHOCYTES # BLD AUTO: 3.16 10*3/MM3 (ref 0.7–3.1)
LYMPHOCYTES NFR BLD AUTO: 34.7 % (ref 19.6–45.3)
MCH RBC QN AUTO: 25.8 PG (ref 26.6–33)
MCHC RBC AUTO-ENTMCNC: 32.1 G/DL (ref 31.5–35.7)
MCV RBC AUTO: 80.3 FL (ref 79–97)
MONOCYTES # BLD AUTO: 0.6 10*3/MM3 (ref 0.1–0.9)
MONOCYTES NFR BLD AUTO: 6.6 % (ref 5–12)
NEUTROPHILS # BLD AUTO: 5.02 10*3/MM3 (ref 1.7–7)
NEUTROPHILS NFR BLD AUTO: 55.2 % (ref 42.7–76)
NRBC BLD AUTO-RTO: 0 /100 WBC (ref 0–0.2)
PLATELET # BLD AUTO: 389 10*3/MM3 (ref 140–450)
POTASSIUM SERPL-SCNC: 4.9 MMOL/L (ref 3.5–5.2)
PROT SERPL-MCNC: 6.8 G/DL (ref 6–8.5)
RBC # BLD AUTO: 4.73 10*6/MM3 (ref 3.77–5.28)
SODIUM SERPL-SCNC: 138 MMOL/L (ref 136–145)
THYROPEROXIDASE AB SERPL-ACNC: 411 IU/ML (ref 0–34)
TRIGL SERPL-MCNC: 256 MG/DL (ref 0–150)
TSH SERPL DL<=0.005 MIU/L-ACNC: 2.19 UIU/ML (ref 0.27–4.2)
VLDLC SERPL CALC-MCNC: 42 MG/DL (ref 5–40)
WBC # BLD AUTO: 9.1 10*3/MM3 (ref 3.4–10.8)

## 2024-06-12 NOTE — TELEPHONE ENCOUNTER
From: Regina Osman  To: Kathi Medrano  Sent: 6/12/2024 1:03 PM EDT  Subject: Wegovy     My insurance denied the authorization for wegovy but it looks like they may approve ozempic based on my plan information. Is it possible to get that prescribed instead?

## 2024-07-01 DIAGNOSIS — F41.1 GENERALIZED ANXIETY DISORDER: ICD-10-CM

## 2024-07-01 RX ORDER — BUPROPION HYDROCHLORIDE 300 MG/1
300 TABLET ORAL DAILY
Qty: 90 TABLET | Refills: 0 | OUTPATIENT
Start: 2024-07-01

## 2024-10-08 DIAGNOSIS — F41.1 GENERALIZED ANXIETY DISORDER: ICD-10-CM

## 2024-10-08 RX ORDER — BUPROPION HYDROCHLORIDE 300 MG/1
300 TABLET ORAL DAILY
Qty: 90 TABLET | Refills: 0 | Status: SHIPPED | OUTPATIENT
Start: 2024-10-08

## 2024-11-15 DIAGNOSIS — I10 PRIMARY HYPERTENSION: ICD-10-CM

## 2024-11-15 DIAGNOSIS — F41.1 GENERALIZED ANXIETY DISORDER: ICD-10-CM

## 2024-11-15 RX ORDER — METOPROLOL SUCCINATE 25 MG/1
25 TABLET, EXTENDED RELEASE ORAL EVERY EVENING
Qty: 90 TABLET | Refills: 1 | Status: SHIPPED | OUTPATIENT
Start: 2024-11-15

## 2024-11-15 RX ORDER — BUPROPION HYDROCHLORIDE 300 MG/1
300 TABLET ORAL DAILY
Qty: 90 TABLET | Refills: 1 | Status: SHIPPED | OUTPATIENT
Start: 2024-11-15

## 2024-11-15 NOTE — TELEPHONE ENCOUNTER
Rx Refill Note  Requested Prescriptions     Pending Prescriptions Disp Refills    buPROPion XL (WELLBUTRIN XL) 300 MG 24 hr tablet 90 tablet      Sig: Take 1 tablet by mouth Daily.    metoprolol succinate XL (TOPROL-XL) 25 MG 24 hr tablet 90 tablet      Sig: Take 1 tablet by mouth Every Evening.      Last office visit with prescribing clinician: 6/11/2024   Next office visit with prescribing clinician: 3/5/2025     Rito Huynh CMA  11/15/24, 08:56 EST

## 2024-12-05 DIAGNOSIS — E06.9 THYROIDITIS: ICD-10-CM

## 2024-12-05 RX ORDER — LEVOTHYROXINE SODIUM 50 UG/1
50 TABLET ORAL EVERY MORNING
Qty: 90 TABLET | Refills: 1 | Status: SHIPPED | OUTPATIENT
Start: 2024-12-05

## 2025-03-05 ENCOUNTER — OFFICE VISIT (OUTPATIENT)
Dept: FAMILY MEDICINE CLINIC | Facility: CLINIC | Age: 35
End: 2025-03-05
Payer: COMMERCIAL

## 2025-03-05 VITALS
BODY MASS INDEX: 35.52 KG/M2 | HEART RATE: 100 BPM | HEIGHT: 66 IN | WEIGHT: 221 LBS | OXYGEN SATURATION: 99 % | DIASTOLIC BLOOD PRESSURE: 81 MMHG | SYSTOLIC BLOOD PRESSURE: 121 MMHG

## 2025-03-05 DIAGNOSIS — Z13.220 NEED FOR LIPID SCREENING: ICD-10-CM

## 2025-03-05 DIAGNOSIS — F41.1 GENERALIZED ANXIETY DISORDER: ICD-10-CM

## 2025-03-05 DIAGNOSIS — E66.01 CLASS 2 SEVERE OBESITY DUE TO EXCESS CALORIES WITH SERIOUS COMORBIDITY AND BODY MASS INDEX (BMI) OF 35.0 TO 35.9 IN ADULT: ICD-10-CM

## 2025-03-05 DIAGNOSIS — E06.9 THYROIDITIS: ICD-10-CM

## 2025-03-05 DIAGNOSIS — E66.812 CLASS 2 SEVERE OBESITY DUE TO EXCESS CALORIES WITH SERIOUS COMORBIDITY AND BODY MASS INDEX (BMI) OF 35.0 TO 35.9 IN ADULT: ICD-10-CM

## 2025-03-05 DIAGNOSIS — Z00.00 ANNUAL PHYSICAL EXAM: Primary | ICD-10-CM

## 2025-03-05 DIAGNOSIS — I10 PRIMARY HYPERTENSION: ICD-10-CM

## 2025-03-05 DIAGNOSIS — Z13.1 SCREENING FOR DIABETES MELLITUS (DM): ICD-10-CM

## 2025-03-05 DIAGNOSIS — E53.8 VITAMIN B 12 DEFICIENCY: ICD-10-CM

## 2025-03-05 RX ORDER — METOPROLOL SUCCINATE 25 MG/1
25 TABLET, EXTENDED RELEASE ORAL EVERY EVENING
Qty: 90 TABLET | Refills: 1 | Status: SHIPPED | OUTPATIENT
Start: 2025-03-05

## 2025-03-05 RX ORDER — ONDANSETRON 4 MG/1
4 TABLET, FILM COATED ORAL EVERY 8 HOURS PRN
Qty: 15 TABLET | Refills: 3 | Status: SHIPPED | OUTPATIENT
Start: 2025-03-05

## 2025-03-05 RX ORDER — TIRZEPATIDE 2.5 MG/.5ML
2.5 INJECTION, SOLUTION SUBCUTANEOUS WEEKLY
Qty: 2 ML | Refills: 0 | Status: SHIPPED | OUTPATIENT
Start: 2025-03-05

## 2025-03-05 RX ORDER — BUPROPION HYDROCHLORIDE 150 MG/1
150 TABLET ORAL DAILY
Qty: 90 TABLET | Refills: 1 | Status: SHIPPED | OUTPATIENT
Start: 2025-03-05

## 2025-03-05 RX ORDER — BUPROPION HYDROCHLORIDE 300 MG/1
300 TABLET ORAL DAILY
Qty: 90 TABLET | Refills: 1 | Status: SHIPPED | OUTPATIENT
Start: 2025-03-05

## 2025-03-05 NOTE — PATIENT INSTRUCTIONS
I have ordered lab tests today.  You should receive a phone call or a [employer] message with those results.  If you have not heard from us in 7-10 days, please call the office.      Aim for 150 minutes of moderate exercise in a week as able.     I have sent Zepbound in to [facility] for the starter dose.  After a few weeks if you are tolerating it okay, we will go up in the dose.  We can do that every month until you max out what is available.  You do want to work on healthy eating and a calorie deficit for long-term goals.    We increased Wellbutrin to the 450mg to see if that is more helpful.

## 2025-03-05 NOTE — PROGRESS NOTES
"Chief Complaint  Annual Exam    Subjective    {CC  Problem List  Visit  Diagnosis   Encounters  Notes  Medications  Labs  Result Review Imaging  Media :23}     Regina Osman presents to [facility] for Annual Exam.    History of Present Illness     Annual Exam    Last pap smear: 11/25/2024 with Dr. Archuleta  Last mammogram: not indicated  Contraception: Natural family planning.   Regular menstrual cycles: yes  Last colonoscopy: not indicated  Ever screened for Hepatitis C: yes  Vaccines: eligible for flu and covid  Exercise: infrequent except playing with her daughter.  Smoking status: never  Alcohol use: rare  Sunscreen use: yes     with one child.  Working in [employer].    Low B12 and intermittently taking supplements. She's been out for a few days.     Hashimoto's thyroiditis on levothyroxine 50mcg.  She does have times when she doesn't feel she has energy.     Hypertension on metoprolol and likes it.  If she doesn't take it she will get pounding headaches.     She is wanting to try GLP-1's and insurance won't allow Wegovy with the coupon.    Having some increased anxiety with work and demands.  On Wellbutrin XL 300mg. and would like to try an increase.     Review of Systems     Objective       Vital Signs:   /81   Pulse 100   Ht 167.6 cm (65.98\")   Wt 100 kg (221 lb)   SpO2 99%   BMI 35.69 kg/m²     Body mass index is 35.69 kg/m².      PHQ-9 Total Score:      Physical Exam  Constitutional:       General: She is not in acute distress.     Appearance: Normal appearance.   HENT:      Head: Normocephalic and atraumatic.      Nose: Nose normal.   Eyes:      Conjunctiva/sclera: Conjunctivae normal.      Pupils: Pupils are equal, round, and reactive to light.   Neck:      Comments: Normal thyroid  Cardiovascular:      Rate and Rhythm: Normal rate and regular rhythm.      Heart sounds: Normal heart sounds.   Pulmonary:      Effort: Pulmonary effort is normal.      Breath " sounds: Normal breath sounds.   Abdominal:      General: Bowel sounds are normal.      Palpations: Abdomen is soft.   Musculoskeletal:      Cervical back: Neck supple.   Skin:     General: Skin is warm.   Neurological:      General: No focal deficit present.      Mental Status: She is alert.      Gait: Gait normal.   Psychiatric:         Behavior: Behavior normal.          Result Review  Data Reviewed:{ Labs  Result Review  Imaging  Med Tab  Media :23}               Discussed healthy diet, exercise, adequate sleep, cancer screening, immunizations and preventative care. Annual eye exam and routine dental cleaning encouraged.        Assessment and Plan {CC Problem List  Visit Diagnosis  ROS  Review (Popup)  Health Maintenance  Quality  BestPractice  Medications  SmartSets  SnapShot Encounters  Media :23}   Diagnoses and all orders for this visit:    1. Annual physical exam (Primary)    2. Generalized anxiety disorder  -     buPROPion XL (Wellbutrin XL) 150 MG 24 hr tablet; Take 1 tablet by mouth Daily. With the 300's  Dispense: 90 tablet; Refill: 1  -     buPROPion XL (WELLBUTRIN XL) 300 MG 24 hr tablet; Take 1 tablet by mouth Daily. With the 150mg  Dispense: 90 tablet; Refill: 1    3. Primary hypertension  -     metoprolol succinate XL (TOPROL-XL) 25 MG 24 hr tablet; Take 1 tablet by mouth Every Evening.  Dispense: 90 tablet; Refill: 1    4. Thyroiditis  -     TSH    5. Vitamin B 12 deficiency  Assessment & Plan:  Recently started back on supplements    Orders:  -     Vitamin B12    6. Need for lipid screening  -     Comprehensive Metabolic Panel  -     Lipid Panel    7. Screening for diabetes mellitus (DM)  -     Hemoglobin A1c    8. Class 2 severe obesity due to excess calories with serious comorbidity and body mass index (BMI) of 35.0 to 35.9 in adult  -     Tirzepatide-Weight Management (Zepbound) 2.5 MG/0.5ML solution; Inject 0.5 mL under the skin into the appropriate area as directed 1 (One)  Time Per Week.  Dispense: 2 mL; Refill: 0  -     ondansetron (Zofran) 4 MG tablet; Take 1 tablet by mouth Every 8 (Eight) Hours As Needed for Nausea or Vomiting.  Dispense: 15 tablet; Refill: 3              Patient Instructions   I have ordered lab tests today.  You should receive a phone call or a [employer] message with those results.  If you have not heard from us in 7-10 days, please call the office.      Aim for 150 minutes of moderate exercise in a week as able.     I have sent Zepbound in to [facility] for the starter dose.  After a few weeks if you are tolerating it okay, we will go up in the dose.  We can do that every month until you max out what is available.  You do want to work on healthy eating and a calorie deficit for long-term goals.    We increased Wellbutrin to the 450mg to see if that is more helpful.          Return in about 6 months (around 9/5/2025).    Kathi Medrano MD

## 2025-03-06 LAB
ALBUMIN SERPL-MCNC: 4.7 G/DL (ref 3.9–4.9)
ALP SERPL-CCNC: 85 IU/L (ref 44–121)
ALT SERPL-CCNC: 11 IU/L (ref 0–32)
AST SERPL-CCNC: 15 IU/L (ref 0–40)
BILIRUB SERPL-MCNC: 0.3 MG/DL (ref 0–1.2)
BUN SERPL-MCNC: 7 MG/DL (ref 6–20)
BUN/CREAT SERPL: 10 (ref 9–23)
CALCIUM SERPL-MCNC: 9.5 MG/DL (ref 8.7–10.2)
CHLORIDE SERPL-SCNC: 100 MMOL/L (ref 96–106)
CHOLEST SERPL-MCNC: 169 MG/DL (ref 100–199)
CO2 SERPL-SCNC: 20 MMOL/L (ref 20–29)
CREAT SERPL-MCNC: 0.68 MG/DL (ref 0.57–1)
EGFRCR SERPLBLD CKD-EPI 2021: 117 ML/MIN/1.73
GLOBULIN SER CALC-MCNC: 2.6 G/DL (ref 1.5–4.5)
GLUCOSE SERPL-MCNC: 102 MG/DL (ref 70–99)
HBA1C MFR BLD: 5.4 % (ref 4.8–5.6)
HDLC SERPL-MCNC: 31 MG/DL
LDLC SERPL CALC-MCNC: 98 MG/DL (ref 0–99)
POTASSIUM SERPL-SCNC: 5 MMOL/L (ref 3.5–5.2)
PROT SERPL-MCNC: 7.3 G/DL (ref 6–8.5)
SODIUM SERPL-SCNC: 137 MMOL/L (ref 134–144)
TRIGL SERPL-MCNC: 233 MG/DL (ref 0–149)
TSH SERPL DL<=0.005 MIU/L-ACNC: 1.68 UIU/ML (ref 0.45–4.5)
VIT B12 SERPL-MCNC: 1691 PG/ML (ref 232–1245)
VLDLC SERPL CALC-MCNC: 40 MG/DL (ref 5–40)

## 2025-03-28 RX ORDER — TIRZEPATIDE 5 MG/.5ML
5 INJECTION, SOLUTION SUBCUTANEOUS WEEKLY
Qty: 2 ML | Refills: 0 | Status: SHIPPED | OUTPATIENT
Start: 2025-03-28 | End: 2025-03-29 | Stop reason: SDUPTHER

## 2025-03-29 RX ORDER — TIRZEPATIDE 5 MG/.5ML
5 INJECTION, SOLUTION SUBCUTANEOUS WEEKLY
Qty: 2 ML | Refills: 0 | Status: SHIPPED | OUTPATIENT
Start: 2025-03-29

## 2025-04-16 ENCOUNTER — PATIENT MESSAGE (OUTPATIENT)
Dept: FAMILY MEDICINE CLINIC | Facility: CLINIC | Age: 35
End: 2025-04-16
Payer: COMMERCIAL

## 2025-04-16 NOTE — TELEPHONE ENCOUNTER
Rx Refill Note  Requested Prescriptions     Pending Prescriptions Disp Refills    Zepbound 5 MG/0.5ML solution [Pharmacy Med Name: ZEPBOUND 5 MG/0.5ML SUBCUTANEOUS SOLUTION] 2 mL 0     Sig: INJECT 0.5 ML (5 MG) UNDER THE SKIN ONCE WEEKLY (0.5ML= 50 UNITS)      Last office visit with prescribing clinician: 3/5/2025   Next office visit with prescribing clinician: 8/15/2025     Yasmani iNce MA  04/16/25, 15:52 EDT        Tired to call patient. Patient didn't answer and no return call.

## 2025-04-22 RX ORDER — TIRZEPATIDE 5 MG/.5ML
5 INJECTION, SOLUTION SUBCUTANEOUS WEEKLY
Qty: 2 ML | Refills: 2 | Status: SHIPPED | OUTPATIENT
Start: 2025-04-22

## 2025-04-22 RX ORDER — TIRZEPATIDE 5 MG/.5ML
INJECTION, SOLUTION SUBCUTANEOUS
Qty: 2 ML | Refills: 0 | OUTPATIENT
Start: 2025-04-22

## 2025-05-22 RX ORDER — TIRZEPATIDE 5 MG/.5ML
INJECTION, SOLUTION SUBCUTANEOUS
Qty: 2 ML | Refills: 2 | Status: SHIPPED | OUTPATIENT
Start: 2025-05-22

## 2025-06-07 DIAGNOSIS — E06.9 THYROIDITIS: ICD-10-CM

## 2025-06-09 RX ORDER — LEVOTHYROXINE SODIUM 50 UG/1
50 TABLET ORAL EVERY MORNING
Qty: 90 TABLET | Refills: 1 | Status: SHIPPED | OUTPATIENT
Start: 2025-06-09

## 2025-06-16 ENCOUNTER — PATIENT MESSAGE (OUTPATIENT)
Dept: FAMILY MEDICINE CLINIC | Facility: CLINIC | Age: 35
End: 2025-06-16
Payer: COMMERCIAL

## 2025-06-16 RX ORDER — TIRZEPATIDE 7.5 MG/.5ML
7.5 INJECTION, SOLUTION SUBCUTANEOUS WEEKLY
Qty: 2 ML | Refills: 0 | Status: SHIPPED | OUTPATIENT
Start: 2025-06-16

## 2025-07-29 RX ORDER — TIRZEPATIDE 7.5 MG/.5ML
INJECTION, SOLUTION SUBCUTANEOUS
Qty: 2 ML | Refills: 0 | Status: SHIPPED | OUTPATIENT
Start: 2025-07-29

## 2025-08-15 ENCOUNTER — OFFICE VISIT (OUTPATIENT)
Dept: FAMILY MEDICINE CLINIC | Facility: CLINIC | Age: 35
End: 2025-08-15
Payer: COMMERCIAL

## 2025-08-15 VITALS
OXYGEN SATURATION: 97 % | BODY MASS INDEX: 29.36 KG/M2 | WEIGHT: 182.7 LBS | DIASTOLIC BLOOD PRESSURE: 80 MMHG | HEIGHT: 66 IN | HEART RATE: 96 BPM | SYSTOLIC BLOOD PRESSURE: 120 MMHG | TEMPERATURE: 98.2 F

## 2025-08-15 DIAGNOSIS — E66.01 CLASS 2 SEVERE OBESITY DUE TO EXCESS CALORIES WITH SERIOUS COMORBIDITY AND BODY MASS INDEX (BMI) OF 35.0 TO 35.9 IN ADULT: ICD-10-CM

## 2025-08-15 DIAGNOSIS — E06.9 THYROIDITIS: ICD-10-CM

## 2025-08-15 DIAGNOSIS — E53.8 VITAMIN B 12 DEFICIENCY: ICD-10-CM

## 2025-08-15 DIAGNOSIS — E66.812 CLASS 2 SEVERE OBESITY DUE TO EXCESS CALORIES WITH SERIOUS COMORBIDITY AND BODY MASS INDEX (BMI) OF 35.0 TO 35.9 IN ADULT: ICD-10-CM

## 2025-08-15 DIAGNOSIS — I10 PRIMARY HYPERTENSION: Primary | ICD-10-CM

## 2025-08-15 DIAGNOSIS — F41.1 GENERALIZED ANXIETY DISORDER: ICD-10-CM

## 2025-08-15 PROCEDURE — 99214 OFFICE O/P EST MOD 30 MIN: CPT | Performed by: FAMILY MEDICINE

## 2025-08-16 LAB
ALBUMIN SERPL-MCNC: 4.8 G/DL (ref 3.5–5.2)
ALBUMIN/GLOB SERPL: 1.9 G/DL
ALP SERPL-CCNC: 87 U/L (ref 39–117)
ALT SERPL-CCNC: 8 U/L (ref 1–33)
AST SERPL-CCNC: 13 U/L (ref 1–32)
BILIRUB SERPL-MCNC: 0.6 MG/DL (ref 0–1.2)
BUN SERPL-MCNC: 8 MG/DL (ref 6–20)
BUN/CREAT SERPL: 10.4 (ref 7–25)
CALCIUM SERPL-MCNC: 9.8 MG/DL (ref 8.6–10.5)
CHLORIDE SERPL-SCNC: 102 MMOL/L (ref 98–107)
CO2 SERPL-SCNC: 23.4 MMOL/L (ref 22–29)
CREAT SERPL-MCNC: 0.77 MG/DL (ref 0.57–1)
EGFRCR SERPLBLD CKD-EPI 2021: 104 ML/MIN/1.73
GLOBULIN SER CALC-MCNC: 2.5 GM/DL
GLUCOSE SERPL-MCNC: 87 MG/DL (ref 65–99)
POTASSIUM SERPL-SCNC: 4.8 MMOL/L (ref 3.5–5.2)
PROT SERPL-MCNC: 7.3 G/DL (ref 6–8.5)
SODIUM SERPL-SCNC: 138 MMOL/L (ref 136–145)
THYROPEROXIDASE AB SERPL-ACNC: 271 IU/ML (ref 0–34)
TSH SERPL DL<=0.005 MIU/L-ACNC: 1.55 UIU/ML (ref 0.27–4.2)
VIT B12 SERPL-MCNC: 524 PG/ML (ref 211–946)

## 2025-08-26 RX ORDER — TIRZEPATIDE 7.5 MG/.5ML
INJECTION, SOLUTION SUBCUTANEOUS
Qty: 2 ML | Refills: 0 | Status: SHIPPED | OUTPATIENT
Start: 2025-08-26